# Patient Record
Sex: MALE | Race: WHITE | NOT HISPANIC OR LATINO | Employment: OTHER | URBAN - METROPOLITAN AREA
[De-identification: names, ages, dates, MRNs, and addresses within clinical notes are randomized per-mention and may not be internally consistent; named-entity substitution may affect disease eponyms.]

---

## 2018-06-25 ENCOUNTER — TELEPHONE (OUTPATIENT)
Dept: NEUROSURGERY | Facility: CLINIC | Age: 73
End: 2018-06-25

## 2018-06-25 DIAGNOSIS — D32.9 MENINGIOMA (HCC): Primary | ICD-10-CM

## 2018-07-24 ENCOUNTER — OFFICE VISIT (OUTPATIENT)
Dept: NEUROSURGERY | Facility: CLINIC | Age: 73
End: 2018-07-24

## 2018-07-24 ENCOUNTER — HOSPITAL ENCOUNTER (OUTPATIENT)
Dept: MRI IMAGING | Facility: HOSPITAL | Age: 73
Discharge: HOME | End: 2018-07-24
Admitting: PHYSICIAN ASSISTANT

## 2018-07-24 VITALS
TEMPERATURE: 98 F | BODY MASS INDEX: 27.72 KG/M2 | HEIGHT: 71 IN | WEIGHT: 198 LBS | SYSTOLIC BLOOD PRESSURE: 130 MMHG | DIASTOLIC BLOOD PRESSURE: 80 MMHG

## 2018-07-24 DIAGNOSIS — D32.9 MENINGIOMA (HCC): Primary | ICD-10-CM

## 2018-07-24 DIAGNOSIS — D32.9 MENINGIOMA (HCC): ICD-10-CM

## 2018-07-24 PROCEDURE — A9577 INJ MULTIHANCE: HCPCS | Performed by: PHYSICIAN ASSISTANT

## 2018-07-24 PROCEDURE — 82565 ASSAY OF CREATININE: CPT

## 2018-07-24 PROCEDURE — 0 GADOBENATE DIMEGLUMINE 529 MG/ML SOLUTION: Performed by: PHYSICIAN ASSISTANT

## 2018-07-24 PROCEDURE — 70553 MRI BRAIN STEM W/O & W/DYE: CPT

## 2018-07-24 PROCEDURE — 99212 OFFICE O/P EST SF 10 MIN: CPT | Performed by: NEUROLOGICAL SURGERY

## 2018-07-24 RX ADMIN — GADOBENATE DIMEGLUMINE 19 ML: 529 INJECTION, SOLUTION INTRAVENOUS at 10:45

## 2018-07-24 NOTE — PROGRESS NOTES
Vernon Wen  1945  [id number]                       CURRENT WORKING DIAGNOSIS:  [Left temporal meningioma ]         MEDICAL HISTORY SINCE LAST ENCOUNTER:  [This is a 72-year-old reports for surveillance of a possibility of a meningioma recurrence.  He is done well over many years.  He has no symptoms to suggest progression. ]           Past Medical History:   Diagnosis Date   • Chronic back pain    • Osteoarthritis    • Osteoarthritis    • Peripheral neuropathy    • Pes planus    • Seizure disorder (CMS/HCC)    • Trochanteric bursitis             No past surgical history on file.         History reviewed. No pertinent family history.           Social History     Social History   • Marital status:      Spouse name: N/A   • Number of children: N/A   • Years of education: N/A     Occupational History   • Not on file.     Social History Main Topics   • Smoking status: Not on file   • Smokeless tobacco: Not on file   • Alcohol use Not on file   • Drug use: Unknown   • Sexual activity: Not on file     Other Topics Concern   • Not on file     Social History Narrative   • No narrative on file            No Known Allergies           Current Outpatient Prescriptions:   •  ALFALFA PO, Take  by mouth daily., Disp: , Rfl:   •  Ascorbic Acid (VITAMIN C PO), Take  by mouth daily., Disp: , Rfl:   •  BIOTIN PO, Take  by mouth daily., Disp: , Rfl:   •  carBAMazepine (CARBATROL) 100 MG 12 hr capsule, Take 100 mg by mouth 2 (two) times a day., Disp: , Rfl:   •  DICLOFENAC PO, Take 50 mg by mouth 2 (two) times a day., Disp: , Rfl:   •  DIPHENHYDRAMINE CITRATE PO, Take  by mouth., Disp: , Rfl:   •  diphenhydrAMINE-acetaminophen (TYLENOL PM)  MG tablet per tablet, Take 1 tablet by mouth at night as needed for sleep., Disp: , Rfl:   •  doxazosin (CARDURA) 2 MG tablet, Take 2 mg by mouth every night., Disp: , Rfl:   •  fluticasone (VERAMYST) 27.5 MCG/SPRAY nasal spray, 2 sprays into each nostril daily., Disp: , Rfl:   •   fluticasone-salmeterol (ADVAIR) 100-50 MCG/DOSE DISKUS, Inhale 2 (two) times a day., Disp: , Rfl:   •  gabapentin (NEURONTIN) 300 MG capsule, Take 300 mg by mouth every night., Disp: , Rfl:   •  GARLIC PO, Take  by mouth., Disp: , Rfl:   •  glucosamine-chondroitin 500-400 MG capsule capsule, Take  by mouth daily., Disp: , Rfl:   •  Green Tea, Camillia sinensis, (GREEN TEA EXTRACT PO), Take  by mouth daily., Disp: , Rfl:   •  levocetirizine (XYZAL) 5 MG tablet, Take 5 mg by mouth every evening., Disp: , Rfl:   •  MELATONIN PO, Take  by mouth daily., Disp: , Rfl:   •  Multiple Vitamins-Minerals (MULTIVITAMIN ADULT PO), Take  by mouth daily., Disp: , Rfl:   •  Omega-3 Fatty Acids (FISH OIL PO), Take  by mouth., Disp: , Rfl:   •  OMEPRAZOLE PO, Take  by mouth daily., Disp: , Rfl:   •  Saw Palmetto, Serenoa repens, (SAW PALMETTO PO), Take  by mouth daily., Disp: , Rfl:   •  tamsulosin (FLOMAX) 0.4 MG capsule 24 hr capsule, Take 1 capsule by mouth daily., Disp: , Rfl:   •  vitamin B-12 (CYANOCOBALAMIN) 500 MCG tablet, Take 500 mcg by mouth daily., Disp: , Rfl:   •  Zolpidem Tartrate 10 MG sublingual tablet, Place  under the tongue every night., Disp: , Rfl:   No current facility-administered medications for this visit.          Review of Systems   Constitutional: Negative for activity change, appetite change, chills, diaphoresis, fatigue, fever and unexpected weight change.   HENT: Positive for congestion, sinus pressure and sneezing. Negative for dental problem, drooling, ear discharge, ear pain, facial swelling, hearing loss, mouth sores, nosebleeds, postnasal drip, rhinorrhea, sore throat, tinnitus, trouble swallowing and voice change.    Eyes: Positive for itching. Negative for photophobia, pain, discharge, redness and visual disturbance.   Respiratory: Negative for apnea, cough, choking, chest tightness, shortness of breath, wheezing and stridor.    Cardiovascular: Positive for chest pain. Negative for palpitations  "and leg swelling.   Gastrointestinal: Positive for constipation. Negative for abdominal distention, abdominal pain, anal bleeding, blood in stool, diarrhea, nausea, rectal pain and vomiting.   Endocrine: Negative for cold intolerance, heat intolerance, polydipsia, polyphagia and polyuria.   Genitourinary: Positive for difficulty urinating. Negative for decreased urine volume, dysuria, enuresis, flank pain, frequency, genital sores, hematuria and urgency.   Musculoskeletal: Positive for arthralgias, back pain and neck stiffness. Negative for gait problem, joint swelling, myalgias and neck pain.   Skin: Negative for color change, pallor, rash and wound.   Allergic/Immunologic: Negative for environmental allergies, food allergies and immunocompromised state.   Neurological: Positive for dizziness, seizures and light-headedness. Negative for tremors, syncope, facial asymmetry, speech difficulty, weakness, numbness and headaches.   Hematological: Negative for adenopathy. Does not bruise/bleed easily.   Psychiatric/Behavioral: Negative for agitation, behavioral problems, confusion, decreased concentration, dysphoric mood, hallucinations, self-injury, sleep disturbance and suicidal ideas. The patient is not nervous/anxious and is not hyperactive.    All other systems reviewed and are negative.              Vitals:    07/24/18 1241   BP: 130/80   BP Location: Left arm   Temp: 98 °F (36.7 °C)   TempSrc: Temporal Artery    Weight: 89.8 kg (198 lb)   Height: 180.3 cm (70.98\")               EXAMINATION: Alert and oriented.  He has foot drop on the right side.  Peripheral pulses are intact.            MEDICAL DECISION MAKING: The MRI is unchanged showing no evidence of recurrence.           ASSESSMENT/DISPOSITION: I do not think that continued sequential follow-up with MRI is indicated in this clinical setting.  His surgery was done many, many years ago and is no evidence of progression or recurrence.  It is highly unlikely that " that would occur at this late date.              I APPRECIATE THE OPPORTUNITY OF THIS REFERRAL. PLEASE CALL IF ANY       QUESTIONS [phone]    Scribed for Naveen Andrade MD by Rukhsana Bazzi CMA. 7/24/2018  12:53 PM     I have read and concur with the information provided by the scribe.  Naveen Andrade MD

## 2018-07-25 LAB — CREAT BLDA-MCNC: 0.7 MG/DL (ref 0.6–1.3)
